# Patient Record
Sex: MALE | Race: WHITE | NOT HISPANIC OR LATINO | Employment: FULL TIME | URBAN - METROPOLITAN AREA
[De-identification: names, ages, dates, MRNs, and addresses within clinical notes are randomized per-mention and may not be internally consistent; named-entity substitution may affect disease eponyms.]

---

## 2017-08-18 ENCOUNTER — OFFICE VISIT (OUTPATIENT)
Dept: URGENT CARE | Facility: PHYSICIAN GROUP | Age: 67
End: 2017-08-18
Payer: COMMERCIAL

## 2017-08-18 ENCOUNTER — HOSPITAL ENCOUNTER (OUTPATIENT)
Dept: RADIOLOGY | Facility: MEDICAL CENTER | Age: 67
End: 2017-08-18
Attending: PHYSICIAN ASSISTANT
Payer: COMMERCIAL

## 2017-08-18 VITALS
WEIGHT: 179 LBS | SYSTOLIC BLOOD PRESSURE: 112 MMHG | HEIGHT: 75 IN | BODY MASS INDEX: 22.26 KG/M2 | DIASTOLIC BLOOD PRESSURE: 62 MMHG | RESPIRATION RATE: 16 BRPM | TEMPERATURE: 97.5 F | OXYGEN SATURATION: 94 % | HEART RATE: 80 BPM

## 2017-08-18 DIAGNOSIS — J18.9 PNEUMONIA OF LEFT LOWER LOBE DUE TO INFECTIOUS ORGANISM: ICD-10-CM

## 2017-08-18 DIAGNOSIS — R05.9 COUGH: ICD-10-CM

## 2017-08-18 PROCEDURE — 99203 OFFICE O/P NEW LOW 30 MIN: CPT | Performed by: PHYSICIAN ASSISTANT

## 2017-08-18 PROCEDURE — 71020 DX-CHEST-2 VIEWS: CPT

## 2017-08-18 RX ORDER — LEVOFLOXACIN 500 MG/1
500 TABLET, FILM COATED ORAL DAILY
Qty: 10 TAB | Refills: 0 | Status: SHIPPED | OUTPATIENT
Start: 2017-08-18 | End: 2017-08-28

## 2017-08-18 RX ORDER — CODEINE PHOSPHATE AND GUAIFENESIN 10; 100 MG/5ML; MG/5ML
SOLUTION ORAL
Qty: 100 ML | Refills: 0 | Status: SHIPPED | OUTPATIENT
Start: 2017-08-18 | End: 2017-11-13 | Stop reason: SDUPTHER

## 2017-08-18 NOTE — PROGRESS NOTES
"Chief Complaint   Patient presents with   • Cough     x 4days,fever       HISTORY OF PRESENT ILLNESS: Patient is a 66 y.o. male who presents today for 5 days of worsening cough.  Patient states it started straight into coughing, chest congestion.  Patient states he has been medicating at home with homeopathic remedies but not helping.  He has been having sweats and chills and perceived fevers.   He is feeling intermittently SOB.  No chest pain, no painful breathing.  He feels rattling in his chest.  No nausea no vomiting.     Patient Active Problem List    Diagnosis Date Noted   • Eczema 2015   • Heart murmur 2015   • Tobacco abuse 2015       Allergies:Eggs    Current Outpatient Prescriptions Ordered in [facility]   Medication Sig Dispense Refill   • triamcinolone acetonide (KENALOG) 0.1 % OINT Apply 1 Application to affected area(s) 2 times a day. 1 Tube 3     No current Epic-ordered facility-administered medications on file.       History reviewed. No pertinent past medical history.    Social History   Substance Use Topics   • Smoking status: Current Every Day Smoker -- 1.00 packs/day     Types: Cigarettes   • Smokeless tobacco: None   • Alcohol Use: No       Family Status   Relation Status Death Age   • Mother     • Father       Family History   Problem Relation Age of Onset   • Heart Disease Mother 86   • Cancer Father      lung       ROS:  Review of Systems   Constitutional: SEE HPI  HENT: SEE HPI  Eyes: Negative for blurred vision.   Respiratory: SEE HPI  Cardiovascular: Negative for chest pain, palpitations, orthopnea and leg swelling.   Gastrointestinal: Negative for heartburn, nausea, vomiting and abdominal pain.   All other systems reviewed and are negative.       Exam:  Blood pressure 112/62, pulse 80, temperature 36.4 °C (97.5 °F), resp. rate 16, height 1.905 m (6' 3\"), weight 81.194 kg (179 lb), SpO2 94 %.  General:  Well nourished, well developed male in NAD  Eyes: CURLY, " EOM within normal limits, no conjunctival injection, no scleral icterus, visual fields and acuity grossly intact.  Ears: Normal shape and symmetry, no tenderness, no discharge. External canals are without any significant edema or erythema. Tympanic membranes are without any inflammation, no effusion. Gross auditory acuity is intact  Nose: Symmetrical, sinuses without tenderness, scant clear rhinorrhea.   Mouth: reasonable hygiene, no erythema exudates or tonsillar enlargement.  Neck: no masses, range of motion within normal limits, no tracheal deviation. No lymphadenopathy  Pulmonary: Normal respiratory effort, wheezes/crackles of the left lung, right CTA.   Cardiovascular: regular rate and rhythm without murmurs, rubs, or gallops.  Abdomen: Soft, nontender, nondistended. Normal bowel sounds. No hepatosplenomegaly or masses, or hernias. No rebound or guarding.  Skin: No visible rashes or lesion. Warm, pink, dry.   Extremities: no clubbing, cyanosis, or edema.  Neuro: A&O x 3. Speech normal/clear.  Normal gait.     RAD    Impression        1.  LEFT lower lobe pneumonia.  2.  Follow-up recommended to resolution, to exclude underlying process.  3.  No pleural fluid or pneumothorax.         Reading Provider Reading Date     Vikas Zepeda M.D. Aug 18, 2017       Assessment/Plan:  1. Pneumonia of left lower lobe due to infectious organism  DX-CHEST-2 VIEWS    levofloxacin (LEVAQUIN) 500 MG tablet    guaifenesin-codeine (CHERATUSSIN AC) Solution oral solution       -RAD as above.  Patient is smoker, >65.    Levaquin written as above.   -codeine cough syrup as above.  Emphasized drowsy and no driving on this medicine.  Patient expressed understanding of this.   -humidifier/steamy showers recommended for lung soothing.  Rest and fluids emphasized.   -strict ER precautions  -emphasized importance of follow up regarding this, repeat RAD in 3-4 weeks.     -counseled smoking cessation.     Ready to quit: No  Counseling given:  Yes      Supportive care, differential diagnoses, and indications for immediate follow-up discussed with patient.   Pathogenesis of diagnosis discussed including typical length and natural progression.   Instructed to return to clinic or nearest emergency department for any change in condition, further concerns, or worsening of symptoms.  Patient states understanding of the plan of care and discharge instructions.  Instructed to make an appointment, for follow up, with their primary care provider.      Diya Gorman PA-C

## 2017-08-18 NOTE — MR AVS SNAPSHOT
"Bharat Singh   2017 9:00 AM   Office Visit   MRN: [id number]    Department:  [facility]   Dept Phone:  [phone]    Description:  Male : 1950   Provider:  Diya Gorman PA-C           Reason for Visit     Cough x 4days,fever      Allergies as of 2017     Allergen Noted Reactions    Eggs 2015         You were diagnosed with     Pneumonia of left lower lobe due to infectious organism   [[id number]]         Vital Signs     Blood Pressure Pulse Temperature Respirations Height Weight    112/62 mmHg 80 36.4 °C (97.5 °F) 16 1.905 m (6' 3\") 81.194 kg (179 lb)    Body Mass Index Oxygen Saturation Smoking Status             22.37 kg/m2 94% Current Every Day Smoker         Basic Information     Date Of Birth Sex Race Ethnicity Preferred Language    1950 Male White Non- English      Problem List              ICD-10-CM Priority Class Noted - Resolved    Eczema L30.9   2015 - Present    Heart murmur R01.1   2015 - Present    Tobacco abuse Z72.0   2015 - Present      Health Maintenance        Date Due Completion Dates    IMM DTaP/Tdap/Td Vaccine (1 - Tdap) 1968 ---    IMM ZOSTER VACCINE 2009 ---    IMM PNEUMOCOCCAL 65+ (ADULT) LOW/MEDIUM RISK SERIES (1 of 2 - PCV13) 2014 ---    IMM INFLUENZA (1) 2017 ---    COLONOSCOPY 2025 (Declined)    Override on 2015: Patient Declined            Current Immunizations     No immunizations on file.      Below and/or attached are the medications your provider expects you to take. Review all of your home medications and newly ordered medications with your provider and/or pharmacist. Follow medication instructions as directed by your provider and/or pharmacist. Please keep your medication list with you and share with your provider. Update the information when medications are discontinued, doses are changed, or new medications (including over-the-counter products) are added; and carry " medication information at all times in the event of emergency situations     Allergies:  EGGS - (reactions not documented)               Medications  Valid as of: August 18, 2017 - 10:35 AM    Generic Name Brand Name Tablet Size Instructions for use    Guaifenesin-Codeine (Solution) ROBITUSSIN -10 mg/5mL Take 1-2 teaspoons at bedtime prn cough. No driving        LevoFLOXacin (Tab) LEVAQUIN 500 MG Take 1 Tab by mouth every day for 10 days.        Triamcinolone Acetonide (Ointment) KENALOG 0.1 % Apply 1 Application to affected area(s) 2 times a day.        .                 Medicines prescribed today were sent to:     [facility] [address] [place] [address] [place], [place] - [address] AT [facility] OF [facility]. & [address] [place]    [address] [facility] [address]    Phone: [phone] Fax: [phone]    Open 24 Hours?: No      Medication refill instructions:       If your prescription bottle indicates you have medication refills left, it is not necessary to call your provider’s office. Please contact your pharmacy and they will refill your medication.    If your prescription bottle indicates you do not have any refills left, you may request refills at any time through one of the following ways: The online Med ePad system (except Urgent Care), by calling your provider’s office, or by asking your pharmacy to contact your provider’s office with a refill request. Medication refills are processed only during regular business hours and may not be available until the next business day. Your provider may request additional information or to have a follow-up visit with you prior to refilling your medication.   *Please Note: Medication refills are assigned a new Rx number when refilled electronically. Your pharmacy may indicate that no refills were authorized even though a new prescription for the same medication is available at the pharmacy. Please request the medicine by name with the pharmacy before contacting your provider  for a refill.        Your To Do List     Future Labs/Procedures Complete By Expires    DX-CHEST-2 VIEWS  As directed 8/18/2018      Instructions    Pneumonia, Adult  Pneumonia is an infection of the lungs.   CAUSES  Pneumonia may be caused by bacteria or a virus. Usually, the infection is caused by breathing in droplets from an infected person's cough or sneeze.   SYMPTOMS   Symptoms of pneumonia include:  · Cough.  · Fever.  · Chest pain.  · Rapid breathing.  · Shortness of breath.  · Shaking chills.  · Mucus production.  DIAGNOSIS   If you have the common symptoms of pneumonia, often your health care provider will confirm the diagnosis with a chest X-ray. The X-ray will show an abnormality in the lung if you have pneumonia. Other tests may be done on your blood, urine, or mucus (sputum) to find the specific cause of your pneumonia. A blood gas test or pulse oximetry test may be needed to check how well your lungs are working.  TREATMENT   Your treatment will depend on whether your pneumonia is caused by bacteria or a virus.   · Bacterial pneumonia is treated with antibiotic medicine.  · Pneumonia that is caused by the influenza virus may be treated with an antiviral medicine.  · Pneumonia that is caused by a virus other than influenza will not respond to antibiotic medicine. This type of pneumonia will have to run its course.   HOME CARE INSTRUCTIONS   · Cough suppressants may be used if you are losing too much rest from coughing at night. However, you should try to avoid taking cough suppresants. This is because coughing helps to remove mucus from your lungs.  · Sleep in a semi-upright position at night. Try sleeping in a reclining chair, or place a few pillows under your head.  · Try using a cold steam vaporizer or humidifier in your home or bedroom. This may help loosen your mucus.  · If you were prescribed an antibiotic medicine, finish all of it even if you start to feel better.  · If you were prescribed an  expectorant, take it as directed by your health care provider. This medicine loosens the mucus so you can cough it up.  · Take medicines only as directed by your health care provider.  · Do not smoke. If you are a smoker and continue to smoke, your cough may last several weeks after your pneumonia has cleared.  · Get rest when you feel tired, or as needed.  PREVENTION  A pneumococcal shot (vaccine) is available to prevent a common bacterial cause of pneumonia. This is usually suggested for:  · People over 65 years old.  · People on chemotherapy.  · People with chronic lung problems, such as bronchitis or emphysema.  · People with immune system problems.  If you are over 65 years old or have a high risk condition, you may receive the pneumococcal vaccine if you have not received it before. In some countries, a routine influenza vaccine is also recommended. This vaccine can help prevent some cases of pneumonia. You may be offered the influenza vaccine as part of your care.  If you are a smoker, it is time to quit in order to prevent pneumonia in the future. You may receive instructions on how to stop smoking. Your health care provider can provide medicines and counseling to help you quit.  SEEK MEDICAL CARE IF:  · You have a fever.  · You cannot control your cough with suppressants at night, and you keep losing sleep.  SEEK IMMEDIATE MEDICAL CARE IF:   · You have worsening shortness of breath.  · You have increased chest pain.  · Your sickness becomes worse, especially if you are an older adult or have a weakened immune system.  · You cough up blood.  · You have pain that is getting worse or is not controlled with medicines.  · Your symptoms are getting worse rather than better.     This information is not intended to replace advice given to you by your health care provider. Make sure you discuss any questions you have with your health care provider.     Document Released: 12/18/2006 Document Revised: 01/08/2016  Document Reviewed: 04/13/2016  Flowgram Interactive Patient Education ©2016 [employer]vier Inc.            Hulafrog Access Code: [email or web address]  Expires: 9/17/2017  8:55 AM    Hulafrog  A secure, online tool to manage your health information     [employer] Hulafrog® is a secure, online tool that connects you to your personalized health information from the privacy of your home -- day or night - making it very easy for you to manage your healthcare. Once the activation process is completed, you can even access your medical information using the Hulafrog chiquita, which is available for free in the Apple Chiquita store or Google Play store.     Hulafrog provides the following levels of access (as shown below):   My Chart Features   [facility] Doctor [facility]  Urgent  Care Non-[facility]  Primary Care  Doctor   Email your healthcare team securely and privately 24/7 X X X    Manage appointments: schedule your next appointment; view details of past/upcoming appointments X      Request prescription refills. X      View recent personal medical records, including lab and immunizations X X X X   View health record, including health history, allergies, medications X X X X   Read reports about your outpatient visits, procedures, consult and ER notes X X X X   See your discharge summary, which is a recap of your hospital and/or ER visit that includes your diagnosis, lab results, and care plan. X X       How to register for Hulafrog:  1. Go to  [email or web address].  2. Click on the Sign Up Now box, which takes you to the New Member Sign Up page. You will need to provide the following information:  a. Enter your Hulafrog Access Code exactly as it appears at the top of this page. (You will not need to use this code after you’ve completed the sign-up process. If you do not sign up before the expiration date, you must request a new code.)   b. Enter your date of birth.   c. Enter your home email address.   d. Click  Submit, and follow the next screen’s instructions.  3. Create a Anaphoret ID. This will be your Arbovax login ID and cannot be changed, so think of one that is secure and easy to remember.  4. Create a Anaphoret password. You can change your password at any time.  5. Enter your Password Reset Question and Answer. This can be used at a later time if you forget your password.   6. Enter your e-mail address. This allows you to receive e-mail notifications when new information is available in Arbovax.  7. Click Sign Up. You can now view your health information.    For assistance activating your Arbovax account, call [phone]        Quit Tobacco Information     Do you want to quit using tobacco?    Quitting tobacco decreases risks of cancer, heart and lung disease, increases life expectancy, improves sense of taste and smell, and increases spending money, among other benefits.    If you are thinking about quitting, we can help.  • [facility] Quit Tobacco Program: 684.959.7003  o Program occurs weekly for four weeks and includes pharmacist consultation on products to support quitting smoking or chewing tobacco. A provider referral is needed for pharmacist consultation.  • Tobacco Users Help Hotline: 2-284-QUIT-NOW (568-8270) or https://[place].quitlogix.org/  o Free, confidential telephone and online coaching for [place] residents. Sessions are designed on a schedule that is convenient for you. Eligible clients receive free nicotine replacement therapy.  • Nationally: www.smokefree.gov  o Information and professional assistance to support both immediate and long-term needs as you become, and remain, a non-smoker. Smokefree.gov allows you to choose the help that best fits your needs.

## 2017-08-18 NOTE — PATIENT INSTRUCTIONS
Pneumonia, Adult  Pneumonia is an infection of the lungs.   CAUSES  Pneumonia may be caused by bacteria or a virus. Usually, the infection is caused by breathing in droplets from an infected person's cough or sneeze.   SYMPTOMS   Symptoms of pneumonia include:  · Cough.  · Fever.  · Chest pain.  · Rapid breathing.  · Shortness of breath.  · Shaking chills.  · Mucus production.  DIAGNOSIS   If you have the common symptoms of pneumonia, often your health care provider will confirm the diagnosis with a chest X-ray. The X-ray will show an abnormality in the lung if you have pneumonia. Other tests may be done on your blood, urine, or mucus (sputum) to find the specific cause of your pneumonia. A blood gas test or pulse oximetry test may be needed to check how well your lungs are working.  TREATMENT   Your treatment will depend on whether your pneumonia is caused by bacteria or a virus.   · Bacterial pneumonia is treated with antibiotic medicine.  · Pneumonia that is caused by the influenza virus may be treated with an antiviral medicine.  · Pneumonia that is caused by a virus other than influenza will not respond to antibiotic medicine. This type of pneumonia will have to run its course.   HOME CARE INSTRUCTIONS   · Cough suppressants may be used if you are losing too much rest from coughing at night. However, you should try to avoid taking cough suppresants. This is because coughing helps to remove mucus from your lungs.  · Sleep in a semi-upright position at night. Try sleeping in a reclining chair, or place a few pillows under your head.  · Try using a cold steam vaporizer or humidifier in your home or bedroom. This may help loosen your mucus.  · If you were prescribed an antibiotic medicine, finish all of it even if you start to feel better.  · If you were prescribed an expectorant, take it as directed by your health care provider. This medicine loosens the mucus so you can cough it up.  · Take medicines only as  directed by your health care provider.  · Do not smoke. If you are a smoker and continue to smoke, your cough may last several weeks after your pneumonia has cleared.  · Get rest when you feel tired, or as needed.  PREVENTION  A pneumococcal shot (vaccine) is available to prevent a common bacterial cause of pneumonia. This is usually suggested for:  · People over 65 years old.  · People on chemotherapy.  · People with chronic lung problems, such as bronchitis or emphysema.  · People with immune system problems.  If you are over 65 years old or have a high risk condition, you may receive the pneumococcal vaccine if you have not received it before. In some countries, a routine influenza vaccine is also recommended. This vaccine can help prevent some cases of pneumonia. You may be offered the influenza vaccine as part of your care.  If you are a smoker, it is time to quit in order to prevent pneumonia in the future. You may receive instructions on how to stop smoking. Your health care provider can provide medicines and counseling to help you quit.  SEEK MEDICAL CARE IF:  · You have a fever.  · You cannot control your cough with suppressants at night, and you keep losing sleep.  SEEK IMMEDIATE MEDICAL CARE IF:   · You have worsening shortness of breath.  · You have increased chest pain.  · Your sickness becomes worse, especially if you are an older adult or have a weakened immune system.  · You cough up blood.  · You have pain that is getting worse or is not controlled with medicines.  · Your symptoms are getting worse rather than better.     This information is not intended to replace advice given to you by your health care provider. Make sure you discuss any questions you have with your health care provider.     Document Released: 12/18/2006 Document Revised: 01/08/2016 Document Reviewed: 04/13/2016  Cortera Interactive Patient Education ©2016 [employer].

## 2017-11-13 ENCOUNTER — OFFICE VISIT (OUTPATIENT)
Dept: URGENT CARE | Facility: PHYSICIAN GROUP | Age: 67
End: 2017-11-13
Payer: COMMERCIAL

## 2017-11-13 ENCOUNTER — HOSPITAL ENCOUNTER (OUTPATIENT)
Dept: RADIOLOGY | Facility: MEDICAL CENTER | Age: 67
End: 2017-11-13
Attending: PHYSICIAN ASSISTANT
Payer: COMMERCIAL

## 2017-11-13 ENCOUNTER — TELEPHONE (OUTPATIENT)
Dept: URGENT CARE | Facility: PHYSICIAN GROUP | Age: 67
End: 2017-11-13

## 2017-11-13 VITALS
DIASTOLIC BLOOD PRESSURE: 68 MMHG | WEIGHT: 176 LBS | SYSTOLIC BLOOD PRESSURE: 122 MMHG | RESPIRATION RATE: 36 BRPM | HEART RATE: 110 BPM | HEIGHT: 75 IN | BODY MASS INDEX: 21.88 KG/M2 | OXYGEN SATURATION: 91 % | TEMPERATURE: 98.6 F

## 2017-11-13 DIAGNOSIS — J18.9 PNEUMONIA OF LEFT LOWER LOBE DUE TO INFECTIOUS ORGANISM: Primary | ICD-10-CM

## 2017-11-13 DIAGNOSIS — R09.02 HYPOXIA: ICD-10-CM

## 2017-11-13 DIAGNOSIS — R05.9 COUGH: ICD-10-CM

## 2017-11-13 DIAGNOSIS — R68.89 FLU-LIKE SYMPTOMS: ICD-10-CM

## 2017-11-13 DIAGNOSIS — R07.1 CHEST PAIN ON BREATHING: ICD-10-CM

## 2017-11-13 LAB
FLUAV+FLUBV AG SPEC QL IA: NEGATIVE
INT CON NEG: NORMAL
INT CON POS: NORMAL

## 2017-11-13 PROCEDURE — 99214 OFFICE O/P EST MOD 30 MIN: CPT | Mod: 25 | Performed by: PHYSICIAN ASSISTANT

## 2017-11-13 PROCEDURE — 87804 INFLUENZA ASSAY W/OPTIC: CPT | Performed by: PHYSICIAN ASSISTANT

## 2017-11-13 PROCEDURE — 71020 DX-CHEST-2 VIEWS: CPT

## 2017-11-13 PROCEDURE — 94640 AIRWAY INHALATION TREATMENT: CPT | Performed by: PHYSICIAN ASSISTANT

## 2017-11-13 RX ORDER — IBUPROFEN 200 MG
200 TABLET ORAL EVERY 6 HOURS PRN
COMMUNITY
End: 2018-10-31

## 2017-11-13 RX ORDER — ACETAMINOPHEN 500 MG
500-1000 TABLET ORAL EVERY 6 HOURS PRN
COMMUNITY
End: 2018-10-31

## 2017-11-13 RX ORDER — IPRATROPIUM BROMIDE AND ALBUTEROL SULFATE 2.5; .5 MG/3ML; MG/3ML
3 SOLUTION RESPIRATORY (INHALATION) ONCE
Status: COMPLETED | OUTPATIENT
Start: 2017-11-13 | End: 2017-11-13

## 2017-11-13 RX ORDER — CODEINE PHOSPHATE AND GUAIFENESIN 10; 100 MG/5ML; MG/5ML
SOLUTION ORAL
Qty: 100 ML | Refills: 0 | Status: SHIPPED | OUTPATIENT
Start: 2017-11-13 | End: 2018-10-31

## 2017-11-13 RX ORDER — CEFTRIAXONE 1 G/1
1 INJECTION, POWDER, FOR SOLUTION INTRAMUSCULAR; INTRAVENOUS ONCE
Status: COMPLETED | OUTPATIENT
Start: 2017-11-13 | End: 2017-11-13

## 2017-11-13 RX ORDER — LEVOFLOXACIN 500 MG/1
500 TABLET, FILM COATED ORAL DAILY
Qty: 7 TAB | Refills: 0 | Status: SHIPPED | OUTPATIENT
Start: 2017-11-13 | End: 2017-11-20

## 2017-11-13 RX ADMIN — IPRATROPIUM BROMIDE AND ALBUTEROL SULFATE 3 ML: 2.5; .5 SOLUTION RESPIRATORY (INHALATION) at 11:10

## 2017-11-13 RX ADMIN — CEFTRIAXONE 1 G: 1 INJECTION, POWDER, FOR SOLUTION INTRAMUSCULAR; INTRAVENOUS at 12:23

## 2017-11-13 ASSESSMENT — ENCOUNTER SYMPTOMS
CARDIOVASCULAR NEGATIVE: 1
BACK PAIN: 1
WHEEZING: 1
SORE THROAT: 1
HEADACHES: 1
FEVER: 1
COUGH: 1
DIZZINESS: 0
MYALGIAS: 1
PSYCHIATRIC NEGATIVE: 1
SHORTNESS OF BREATH: 1
GASTROINTESTINAL NEGATIVE: 1
EYES NEGATIVE: 1
CHILLS: 1

## 2017-11-13 NOTE — PROGRESS NOTES
Subjective:      Bharat Singh is a 67 y.o. male who presents with Cough (bodyaches x 5 days)            HPI  Chief Complaint   Patient presents with   • Cough     bodyaches x 5 days       HPI:  Bharat Singh is a 67 y.o.Male who presents with cough and body aches ×5 days. He is every day smoker not interested in quitting at this time. Has been having productive cough with yellow mucus. Fever of up to 102 at home. Has been taking ibuprofen. Did not get flu shot this year due to allergy to eggs. Worsening headache and shortness of breath Patient denies HA,  palpitations, or n/v/d.  Also had vomiting over the past several days and worsening body aches since Saturday.    Worsening productive cough, patient states he feels way worse that his last episode of left lower lobe pneumonia in August.    sNo past medical history on file.    No past surgical history on file.    Family History   Problem Relation Age of Onset   • Heart Disease Mother 86   • Cancer Father      lung     No pertinent family history.    Social History     Social History   • Marital status:      Spouse name: N/A   • Number of children: N/A   • Years of education: N/A     Occupational History   • Not on file.     Social History Main Topics   • Smoking status: Current Every Day Smoker     Packs/day: 1.00     Types: Cigarettes   • Smokeless tobacco: Former User   • Alcohol use No   • Drug use: No   • Sexual activity: Yes     Partners: Female     Other Topics Concern   • Not on file     Social History Narrative   • No narrative on file         Current Outpatient Prescriptions:   •  acetaminophen, 500-1,000 mg, Oral, Q6HRS PRN, 11/13/2017  •  ibuprofen, 200 mg, Oral, Q6HRS PRN  •  guaifenesin-codeine, Take 1-2 teaspoons at bedtime prn cough. No driving  •  triamcinolone acetonide, 1 Application, Topical, BID, not taking    Allergies   Allergen Reactions   • Eggs         Review of Systems   Constitutional: Positive for chills, fever and  "malaise/fatigue.   HENT: Positive for congestion and sore throat.    Eyes: Negative.    Respiratory: Positive for cough, shortness of breath and wheezing.    Cardiovascular: Negative.    Gastrointestinal: Negative.    Genitourinary: Negative.    Musculoskeletal: Positive for back pain and myalgias.   Neurological: Positive for headaches. Negative for dizziness.   Endo/Heme/Allergies: Negative.    Psychiatric/Behavioral: Negative.           Objective:     /68   Pulse (!) 110   Temp 37 °C (98.6 °F)   Resp (!) 36   Ht 1.905 m (6' 3\")   Wt 79.8 kg (176 lb)   SpO2 91%   BMI 22.00 kg/m²      Physical Exam       Nursing note and vitals reviewed.    Constitutional:   Appropriately groomed, pleasant affect, well nourished, in NAD.    Head:   Normocephalic, atraumatic.    Eyes:   PERRLA, EOM's full, sclera white, conjunctiva not erythematous, and medial canthus without exudate bilaterally.    Ears:  Auricle and tragus non-tender to manipulation.  No pre-auricular lymphadenopathy or mastoid ttp.  EACs with mild cerumen bilaterally, not erythematous.  TM’s pearly gray with cone of light present and umbo and malleolus visible bilaterally.  No bulging or fluid bubbles present in middle ear.  Hearing grossly intact to voice.    Nose:  Nares not patent bilaterally.  Nasal mucosa edematous with white rhinorrhea bilaterally.  Mild sinus tenderness to percussion.    Throat:  Dentition wnl, mucosa moist without lesions.  Oropharynx mildly erythematous, with no enlargement of the palatine tonsils bilaterally with no exudates.    Post nasal drainage  present.  Soft palate rises symmetrically bilaterally and uvula midline.      Neck: Neck supple, with mild anterior lymphadenopathy that is soft and mobile to palpation. Thyroid non-palpable without tenderness or nodules. No supraclavicular lymphadenopathy.    Lungs:  Respiratory effort not labored without accessory muscle use.  Lungs with inspiratoryAnd expiratory wheezes to " auscultation throughout. Left lower lung rales. Rhonchi cleared with cough.    Heart:  RRR, without murmurs rubs or gallops.  Radial and dorsalis pedis pulse 2+ bilaterally.  No LE edema.    Musculoskeletal:  Gait non-antalgic with a narrow base.    Derm:  Skin without rashes or lesions with good turgor pressure.      Psychiatric:  Mood, affect, and judgement appropriate.       11/13/2017 11:30 AM    HISTORY/REASON FOR EXAM:  Cough, fever and chills for one week      TECHNIQUE/EXAM DESCRIPTION AND NUMBER OF VIEWS:  Two views of the chest.    COMPARISON:  8/18/2017    FINDINGS:      The mediastinal and cardiac silhouette is unremarkable.    The pulmonary vascularity is within normal limits.    There is ill-defined parenchymal process in the region of the lingula and left lower lobe. This was also seen on the prior study.    There is no significant pleural effusion.    There is no visible pneumothorax.    There are no acute bony abnormalities.   Impression       1.  There is continued lingular and left lower lobe parenchymal process suspicious for pneumonitis. A proximal obstructing process cannot be excluded. A CT of the chest with contrast is recommended.   Reading Provider Reading Date   Fidelia Godinez M.D. Nov 13, 2017   Signing Provider Signing Date Signing Time   Fidelia Godinez M.D. Nov 13, 2017 11:45 AM       Assessment/Plan:     1. Pneumonia of left lower lobe due to infectious organism (CMS-[facility])  guaifenesin-codeine (CHERATUSSIN AC) Solution oral solution    cefTRIAXone (ROCEPHIN) injection 1 g    levofloxacin (LEVAQUIN) 500 MG tablet    UC AMA/Refusal of Treatment    CANCELED: CT-CTA CHEST PULMONARY ARTERY W/ RECONS   2. Cough  DX-CHEST-2 VIEWS    POCT Influenza A/B    ipratropium-albuterol (DUONEB) nebulizer solution 3 mL    UC AMA/Refusal of Treatment    CANCELED: CT-CTA CHEST PULMONARY ARTERY W/ RECONS   3. Flu-like symptoms  DX-CHEST-2 VIEWS    POCT Influenza A/B    ipratropium-albuterol (DUONEB)  nebulizer solution 3 mL    UC AMA/Refusal of Treatment    CANCELED: CT-CTA CHEST PULMONARY ARTERY W/ RECONS   4. Chest pain on breathing  UC AMA/Refusal of Treatment    CANCELED: CT-CTA CHEST PULMONARY ARTERY W/ RECONS   5. Hypoxia       Patient presents with cough for the past week which did improve slightly Thursday/Friday and then worsened again. Patient now with significant muscle/bodyaches and started on Saturday with headache and shortness of breath. On exam patient is afebrile but tachycardic with an elevated respiratory rate and borderline hypoxia. Rapid flu negative. Post DuoNeb patient's oxygen saturation did Not improve. Still at 91% room air. Place patient on 2 L nasal cannula with only mild improvement to 93%. Lungs with slight improvement to auscultation. X-ray reviewed by me and was patient shows continuation of consolidation in the left lower lobe with concern for proximal obstructing object. Did discuss with patient at length ER transfer due to concern for hypoxia, and pulmonary embolism patient deferred. Did discuss CTA imaging which patient also deferred. Did discuss lab tests which patient deferred.    Discussed patient's case with Dr. Alcantar who agreed with ER transfer.    Tobacco cessation:  Ready to quit: Yes  Counseling given: Yes     Please note that this dictation was created using voice recognition software.  I have made every reasonable attempt to correct obvious errors, but I expect there are errors of dayna and possibly content that I did not discover before finalizing the note.

## 2017-11-14 NOTE — TELEPHONE ENCOUNTER
Called patient and discussed with him again the importance of going to the ER. He did not sound well on the phone out of breath. Patient states he has not worsens still has chest pain. Still has productive cough. Plans to go to the emergency room in the morning. Advised him to call 911 at any time over the night if worsening. Also offered: [facility] pickup now at this time and patient deferred.

## 2017-11-15 ENCOUNTER — TELEPHONE (OUTPATIENT)
Dept: URGENT CARE | Facility: CLINIC | Age: 67
End: 2017-11-15

## 2018-10-15 ENCOUNTER — OFFICE VISIT (OUTPATIENT)
Dept: MEDICAL GROUP | Facility: MEDICAL CENTER | Age: 68
End: 2018-10-15
Payer: COMMERCIAL

## 2018-10-15 VITALS
SYSTOLIC BLOOD PRESSURE: 124 MMHG | WEIGHT: 188 LBS | OXYGEN SATURATION: 98 % | HEIGHT: 75 IN | BODY MASS INDEX: 23.38 KG/M2 | HEART RATE: 84 BPM | RESPIRATION RATE: 16 BRPM | DIASTOLIC BLOOD PRESSURE: 72 MMHG

## 2018-10-15 DIAGNOSIS — Z23 NEED FOR PNEUMOCOCCAL VACCINATION: ICD-10-CM

## 2018-10-15 DIAGNOSIS — Z00.00 ROUTINE GENERAL MEDICAL EXAMINATION AT A HEALTH CARE FACILITY: ICD-10-CM

## 2018-10-15 DIAGNOSIS — M25.552 PAIN OF LEFT HIP JOINT: ICD-10-CM

## 2018-10-15 DIAGNOSIS — Z72.0 TOBACCO ABUSE: ICD-10-CM

## 2018-10-15 DIAGNOSIS — R01.1 HEART MURMUR: ICD-10-CM

## 2018-10-15 DIAGNOSIS — R93.89 ABNORMAL CHEST X-RAY: ICD-10-CM

## 2018-10-15 PROCEDURE — 90471 IMMUNIZATION ADMIN: CPT | Performed by: NURSE PRACTITIONER

## 2018-10-15 PROCEDURE — 99214 OFFICE O/P EST MOD 30 MIN: CPT | Mod: 25 | Performed by: NURSE PRACTITIONER

## 2018-10-15 PROCEDURE — 90670 PCV13 VACCINE IM: CPT | Performed by: NURSE PRACTITIONER

## 2018-10-15 ASSESSMENT — PATIENT HEALTH QUESTIONNAIRE - PHQ9: CLINICAL INTERPRETATION OF PHQ2 SCORE: 0

## 2018-10-15 NOTE — PROGRESS NOTES
Subjective:      Bharat Singh is a 68 y.o. male who presents with [facility]        CC: Patient is here today to reestablish care as well as for need of lab work and imaging as well as left hip pain.    HPI Bharat Singh      1. Abnormal chest x-ray  Patient was seen at urgent care about a year ago and at that time he was treated for a pneumonia.  His follow-up chest x-ray 2 months later showed continued lingular and left lower lobe parenchymal process.  A proximal obstructing process could not be excluded and a CT was recommended but not done.  He states he is over his pneumonia but does continue to smoke at least a pack of cigarettes a day.    2. Tobacco abuse  Patient continues to smoke 1 pack of cigarettes a day and is not ready to quit.    3. Routine general medical examination at a health care facility  Patient was seen in 2015 he declined all lab work and health maintenance stating he only wanted to come in when he was ill.    4. Heart murmur  Patient was noted to have heart murmur in 2015 which he states was first noted many years ago when he was in the .  He declined echocardiogram then and now.  He states he feels good and is able to walk at least a mile a day without any problems such as chest pain or shortness of breath.    5. Pain of left hip joint  Patient reports he does get some pain in his left hip mainly because he works as a  which requires much walking.  He states he has not falling and he usually takes Tylenol as needed for pain.  It does not radiate down his leg.      6. Need for pneumococcal vaccination  Patient declines flu vaccine but states he would like pneumonia vaccine.  Social History   Substance Use Topics   • Smoking status: Current Every Day Smoker     Packs/day: 1.00     Types: Cigarettes   • Smokeless tobacco: Former User   • Alcohol use No     Current Outpatient Prescriptions   Medication Sig Dispense Refill   • acetaminophen (TYLENOL) 500 MG Tab  "Take 500-1,000 mg by mouth every 6 hours as needed.     • ibuprofen (MOTRIN) 200 MG Tab Take 200 mg by mouth every 6 hours as needed.     • guaifenesin-codeine (CHERATUSSIN AC) Solution oral solution Take 1-2 teaspoons at bedtime prn cough. No driving 100 mL 0   • triamcinolone acetonide (KENALOG) 0.1 % OINT Apply 1 Application to affected area(s) 2 times a day. 1 Tube 3     No current facility-administered medications for this visit.    History reviewed. No pertinent past medical history.   Family History   Problem Relation Age of Onset   • Heart Disease Mother 86   • Cancer Father         lung       Review of Systems   Musculoskeletal: Positive for joint pain.   All other systems reviewed and are negative.         Objective:     /72 (BP Location: Left arm, Patient Position: Sitting, BP Cuff Size: Adult)   Pulse 84   Resp 16   Ht 1.905 m (6' 3\")   Wt 85.3 kg (188 lb)   SpO2 98%   BMI 23.50 kg/m²      Physical Exam   Constitutional: He is oriented to person, place, and time. He appears well-developed and well-nourished. No distress.   HENT:   Head: Normocephalic and atraumatic.   Right Ear: External ear normal.   Left Ear: External ear normal.   Nose: Nose normal.   Mouth/Throat: Oropharynx is clear and moist.   Eyes: Conjunctivae are normal. Right eye exhibits no discharge. Left eye exhibits no discharge.   Neck: Normal range of motion. Neck supple. No tracheal deviation present. No thyromegaly present.   Cardiovascular: Normal rate and regular rhythm.    Murmur heard.  Pulmonary/Chest: Effort normal. No respiratory distress. He has decreased breath sounds. He has no wheezes. He has no rales.   Musculoskeletal:        Left hip: He exhibits decreased range of motion.   Lymphadenopathy:     He has no cervical adenopathy.   Neurological: He is alert and oriented to person, place, and time. Coordination normal.   Skin: Skin is warm and dry. No rash noted. He is not diaphoretic. No erythema.   Psychiatric: " He has a normal mood and affect. His behavior is normal. Judgment and thought content normal.   Nursing note and vitals reviewed.              Assessment/Plan:     1. Abnormal chest x-ray  I advised patient on a CT of the chest based on his previous x-ray report and long-term tobacco usage and it appears he is willing to go for this.  If it is abnormal he will be referred to pulmonology.  - CT-CHEST (THORAX) W/O; Future    2. Tobacco abuse  Patient is still unwilling to quit smoking.  - CT-CHEST (THORAX) W/O; Future    3. Routine general medical examination at a health care facility  Patient advised he should at least have yearly blood work and I will have him follow back to discuss results.  He declines most of his health maintenance except for pneumonia vaccine.   - COMP METABOLIC PANEL; Future  - LIPID PROFILE; Future  - CBC WITHOUT DIFFERENTIAL; Future    4. Heart murmur  As I told patient in 2015, I strongly advise an echocardiogram to assess his valvular function but he again declines.  He states he feels well and is able to ambulate without shortness of breath and therefore does not wish to have an echocardiogram done.     5. Pain of left hip joint  The patient was offered options including physical therapy and a referral to sports medicine orthopedics but he declined both.  He may take Tylenol for pain for now and try to do less excessive lifting and walking.  He probably should have x-rays in the near future.    6. Need for pneumococcal vaccination  I have placed the below orders and discussed them with an approved delegating provider. The MA is performing the below orders under the direction of Dr. Paige  I advised second pneumonia vaccine next year and he declined flu vaccine.  - Prevnar 13 PCV-13

## 2018-10-31 ENCOUNTER — OFFICE VISIT (OUTPATIENT)
Dept: MEDICAL GROUP | Facility: MEDICAL CENTER | Age: 68
End: 2018-10-31
Payer: COMMERCIAL

## 2018-10-31 VITALS
BODY MASS INDEX: 23.62 KG/M2 | WEIGHT: 190 LBS | OXYGEN SATURATION: 96 % | SYSTOLIC BLOOD PRESSURE: 118 MMHG | DIASTOLIC BLOOD PRESSURE: 72 MMHG | TEMPERATURE: 97.1 F | RESPIRATION RATE: 16 BRPM | HEIGHT: 75 IN | HEART RATE: 70 BPM

## 2018-10-31 DIAGNOSIS — R01.1 HEART MURMUR: ICD-10-CM

## 2018-10-31 DIAGNOSIS — R93.89 ABNORMAL CHEST X-RAY: ICD-10-CM

## 2018-10-31 DIAGNOSIS — N48.89 PENILE SWELLING: ICD-10-CM

## 2018-10-31 DIAGNOSIS — Z72.0 TOBACCO ABUSE: ICD-10-CM

## 2018-10-31 PROCEDURE — 99213 OFFICE O/P EST LOW 20 MIN: CPT | Performed by: NURSE PRACTITIONER

## 2018-10-31 RX ORDER — TRIAMCINOLONE ACETONIDE 0.25 MG/G
1 CREAM TOPICAL 2 TIMES DAILY
Qty: 1 TUBE | Refills: 0 | Status: SHIPPED | OUTPATIENT
Start: 2018-10-31

## 2018-10-31 NOTE — PROGRESS NOTES
Subjective:      Bharat Singh is a 68 y.o. male who presents with Other (personal, pt did not wated to talk about it)    CC: Patient here today for penile swelling concerns.      Bharat Singh        1. Penile swelling  Patient reports that for about 10 months he has noted that it is harder to retract his foreskin.  Sometimes he will get some tearing of the foreskin when he has intercourse or just trying to pull back his foreskin.  He states he has never been circumcised and has had no ulcers, penile discharge or dysuria.  Nothing seems to make it better and symptoms are worse with sex.    2. Tobacco abuse  Patient continues to smoke cigarettes at least 1 pack/day.    3. Heart murmur  Patient has history of heart murmur which he states he has had since he has been a child but has been asymptomatic so did not want to go further with imaging studies.  He states it was last imaged in 2015 when he was in the .    4. Abnormal chest x-ray  Patient had a chest x-ray in 2017 showing pneumonia.  3 months later he had a follow-up chest x-ray showing ill-defined parenchymal process in the region of the lingula and left lower lobe.  CT of the chest was recommended but unfortunately he has not scheduled for the testing as of yet because he is concerned about costs even though he does have insurance.  It was ordered without contrast to lessen the cost.  He has not yet done his lab work yet as well.  Social History   Substance Use Topics   • Smoking status: Current Every Day Smoker     Packs/day: 1.00     Types: Cigarettes   • Smokeless tobacco: Former User   • Alcohol use No     Current Outpatient Prescriptions   Medication Sig Dispense Refill   • triamcinolone acetonide (KENALOG) 0.025 % Cream Apply 1 Application to affected area(s) 2 times a day. 1 Tube 0   • triamcinolone acetonide (KENALOG) 0.1 % OINT Apply 1 Application to affected area(s) 2 times a day. 1 Tube 3     No current facility-administered medications  "for this visit.    History reviewed. No pertinent past medical history.   Family History   Problem Relation Age of Onset   • Heart Disease Mother 86   • Cancer Father         lung       HPI    Review of Systems   Skin: Positive for rash.   All other systems reviewed and are negative.         Objective:     /72 (BP Location: Right arm, Patient Position: Sitting, BP Cuff Size: Adult)   Pulse 70   Temp 36.2 °C (97.1 °F) (Temporal)   Resp 16   Ht 1.905 m (6' 3\")   Wt 86.2 kg (190 lb)   SpO2 96%   BMI 23.75 kg/m²      Physical Exam   Constitutional: He is oriented to person, place, and time. He appears well-developed and well-nourished. No distress.   HENT:   Head: Normocephalic and atraumatic.   Right Ear: External ear normal.   Left Ear: External ear normal.   Nose: Nose normal.   Mouth/Throat: Oropharynx is clear and moist.   Eyes: Conjunctivae are normal. Right eye exhibits no discharge. Left eye exhibits no discharge.   Neck: Normal range of motion. Neck supple. No tracheal deviation present. No thyromegaly present.   Cardiovascular: Normal rate and regular rhythm.    Murmur heard.  Pulmonary/Chest: Effort normal and breath sounds normal. No respiratory distress. He has no wheezes. He has no rales.   Genitourinary:   Genitourinary Comments: Patient is able to pull back his foreskin although he reports it is very tight and difficult to do so.  No obvious rash noted.   Lymphadenopathy:     He has no cervical adenopathy.   Neurological: He is alert and oriented to person, place, and time. Coordination normal.   Skin: Skin is warm and dry. No rash noted. He is not diaphoretic. No erythema.   Psychiatric: He has a normal mood and affect. His behavior is normal. Judgment and thought content normal.   Nursing note and vitals reviewed.              Assessment/Plan:     1. Penile swelling  This is an uncircumcised male who is complaining of difficulty retracting his foreskin for the past 10 months.  I will " therefore refer him to urology.  I told him he can try using some steroid cream around the area to see if this reduces inflammation but if it does not help after a week he should stop it.  I told him it should not be used for more than 2 weeks at a time.  He was advised to go to the emergency room if he finds before his urology appointment, he is unable to retract his foreskin.  - REFERRAL TO UROLOGY  - triamcinolone acetonide (KENALOG) 0.025 % Cream; Apply 1 Application to affected area(s) 2 times a day.  Dispense: 1 Tube; Refill: 0    2. Tobacco abuse  Patient again advised to quit smoking.    3. Heart murmur  Patient continues to have loud murmur and again refuses to go for echocardiogram because he states he feels well.    4. Abnormal chest x-ray  Patient reminded he was advised to go for CTA of the chest based on his previous chest x-ray from 2017 and recommendations from radiology.  I explained that with his smoking history he is high risk for lung cancer and therefore should have this evaluated.  He again states that he is not sure he wants to pay the co-pays.  I also reminded him he is overdue for his lab work.

## 2019-04-16 ENCOUNTER — TELEPHONE (OUTPATIENT)
Dept: MEDICAL GROUP | Facility: MEDICAL CENTER | Age: 69
End: 2019-04-16

## 2021-01-15 DIAGNOSIS — Z23 NEED FOR VACCINATION: ICD-10-CM

## 2023-05-22 NOTE — H&P
REFERRING PHYSICIAN: Sharad Martinez MD.     CONSULTING PHYSICIAN: Minerva Loving MD     CHIEF COMPLAINT: Shortness of Breath    HISTORY OF PRESENT ILLNESS: The patient is a 72 y.o. male with a past medical history of COPD, tobacco abuse, aortic stenosis who presents to the office with shortness of breath over the last six months.  He has associated fatigue.  He has had some episodes of chest pain associated with Covid infection.  He denies lower extremity edema, dizziness, syncope.  He was seen by his cardiologist and underwent an echocardiogram which shows a progression to severe aortic stenosis.    He is accompanied by his wife today.    PAST MEDICAL HISTORY:   Active Ambulatory Problems     Diagnosis Date Noted    Eczema 01/09/2015    Heart murmur 01/09/2015    Tobacco abuse 01/09/2015    Abnormal chest x-ray 10/31/2018     Resolved Ambulatory Problems     Diagnosis Date Noted    No Resolved Ambulatory Problems     No Additional Past Medical History       PAST SURGICAL HISTORY: No previous chest surgery     ALLERGIES:   Allergies   Allergen Reactions    Eggs         CURRENT MEDICATIONS:   Current Outpatient Medications:     traMADol (ULTRAM) 50 MG Tab, TAKE 1 TABLET BY MOUTH EVERY 12 HOURS AS NEEDED FOR PAIN. R07.9. DNFU 5/5/23, Disp: , Rfl:     oxyCODONE immediate-release (ROXICODONE) 5 MG Tab, TAKE 1 TABLET BY MOUTH EVERY 4 HOURS FOR 3 DAYS AS NEEDED FOR MODERATE PAIN, Disp: , Rfl:     BREZTRI AEROSPHERE 160-9-4.8 MCG/ACT Aerosol, , Disp: , Rfl:     albuterol 108 (90 Base) MCG/ACT Aero Soln inhalation aerosol, Inhale 2 Puffs every 6 hours., Disp: , Rfl:     triamcinolone acetonide (KENALOG) 0.025 % Cream, Apply 1 Application to affected area(s) 2 times a day., Disp: 1 Tube, Rfl: 0    triamcinolone acetonide (KENALOG) 0.1 % OINT, Apply 1 Application to affected area(s) 2 times a day., Disp: 1 Tube, Rfl: 3    FAMILY HISTORY:   Family History   Problem Relation Age of Onset    Heart Disease Mother 86    Cancer  Father         lung        SOCIAL HISTORY:   Social History     Socioeconomic History    Marital status:      Spouse name: Not on file    Number of children: Not on file    Years of education: Not on file    Highest education level: Not on file   Occupational History    Not on file   Tobacco Use    Smoking status: Every Day     Packs/day: 1.00     Types: Cigarettes    Smokeless tobacco: Former   Substance and Sexual Activity    Alcohol use: No    Drug use: No    Sexual activity: Yes     Partners: Female   Other Topics Concern    Not on file   Social History Narrative    Not on file     Social Determinants of Health     Financial Resource Strain: Not on file   Food Insecurity: Not on file   Transportation Needs: Not on file   Physical Activity: Not on file   Stress: Not on file   Social Connections: Not on file   Intimate Partner Violence: Not on file   Housing Stability: Not on file       REVIEW OF SYSTEMS:  Review of Systems   Constitutional:  Positive for malaise/fatigue. Negative for chills, fever and weight loss.   HENT:  Negative for ear pain, nosebleeds and tinnitus.    Eyes:  Negative for double vision, photophobia and pain.   Respiratory:  Positive for shortness of breath. Negative for cough and hemoptysis.    Cardiovascular:  Negative for chest pain, palpitations, orthopnea, leg swelling and PND.   Gastrointestinal:  Negative for abdominal pain, blood in stool, nausea and vomiting.   Genitourinary:  Negative for frequency, hematuria and urgency.   Musculoskeletal: Negative.    Skin:  Negative for rash.   Neurological:  Negative for dizziness, tremors, speech change, focal weakness, seizures and headaches.   Endo/Heme/Allergies:  Negative for polydipsia. Does not bruise/bleed easily.   Psychiatric/Behavioral:  Negative for hallucinations and memory loss.          PHYSICAL EXAMINATION:    /62 (BP Location: Left arm, Patient Position: Sitting, BP Cuff Size: Adult)   Pulse 74   Temp 36.7 °C (98  "°F) (Temporal)   Ht 1.905 m (6' 3\")   Wt 71 kg (156 lb 8 oz)   SpO2 97%   BMI 19.56 kg/m²    Physical Exam  Vitals reviewed.   Constitutional:       General: He is not in acute distress.     Appearance: Normal appearance.      Comments: Strong smell of cigarettes   HENT:      Head: Normocephalic and atraumatic.      Right Ear: External ear normal.      Left Ear: External ear normal.      Nose: Nose normal. No congestion.   Eyes:      General: No scleral icterus.     Extraocular Movements: Extraocular movements intact.      Conjunctiva/sclera: Conjunctivae normal.   Cardiovascular:      Rate and Rhythm: Normal rate and regular rhythm.   Pulmonary:      Effort: Pulmonary effort is normal. No respiratory distress.   Abdominal:      General: There is no distension.      Palpations: Abdomen is soft.   Musculoskeletal:         General: Normal range of motion.      Cervical back: Normal range of motion.   Skin:     General: Skin is warm and dry.      Coloration: Skin is not jaundiced.      Findings: No rash.   Neurological:      Mental Status: He is alert and oriented to person, place, and time.      Cranial Nerves: No cranial nerve deficit.   Psychiatric:         Mood and Affect: Mood normal.         Behavior: Behavior normal.         IMAGING REVIEWED AND INTERPRETED:    ECHOCARDIOGRAM   LVEF: 60%  Mean AV Gradient 47mmHg  Peak AV Gradient :  AV Max: 4.57 m/s  BEST: 0.6cm2    CARDIAC CATHETERIZATION   4/17/23   No angiographically significant coronary artery disease    CT SCAN CHEST pending      IMPRESSION:  71 yo gentleman with known conditions of COPD and ongoing tobacco abuse. He now has severe symptomatic aortic stenosis.      PLAN:  I recommend that the patient is a reasonable TAVR candidate given age and strong preference. He states that under no circumstances would he want a sternotomy. We will continue to follow workup and discuss in multidisciplinary conference.    The STS mortality risk score is 1% and the " morbidity and mortality risk score is 7.3%. The scores were discussed with patient.    Thank you for this very challenging consultation and participation in the patient’s care.  I will keep you apprised of all future developments.      Sincerely,     Minerva Loving MD

## 2023-05-23 ENCOUNTER — OFFICE VISIT (OUTPATIENT)
Dept: CARDIOTHORACIC SURGERY | Facility: MEDICAL CENTER | Age: 73
End: 2023-05-23
Payer: MEDICARE

## 2023-05-23 VITALS
HEART RATE: 74 BPM | SYSTOLIC BLOOD PRESSURE: 110 MMHG | HEIGHT: 75 IN | TEMPERATURE: 98 F | DIASTOLIC BLOOD PRESSURE: 62 MMHG | OXYGEN SATURATION: 97 % | BODY MASS INDEX: 19.46 KG/M2 | WEIGHT: 156.5 LBS

## 2023-05-23 DIAGNOSIS — I35.0 SEVERE AORTIC STENOSIS: ICD-10-CM

## 2023-05-23 PROCEDURE — 3074F SYST BP LT 130 MM HG: CPT | Performed by: THORACIC SURGERY (CARDIOTHORACIC VASCULAR SURGERY)

## 2023-05-23 PROCEDURE — 99205 OFFICE O/P NEW HI 60 MIN: CPT | Performed by: THORACIC SURGERY (CARDIOTHORACIC VASCULAR SURGERY)

## 2023-05-23 PROCEDURE — 3078F DIAST BP <80 MM HG: CPT | Performed by: THORACIC SURGERY (CARDIOTHORACIC VASCULAR SURGERY)

## 2023-05-23 RX ORDER — BUDESONIDE, GLYCOPYRROLATE, AND FORMOTEROL FUMARATE 160; 9; 4.8 UG/1; UG/1; UG/1
AEROSOL, METERED RESPIRATORY (INHALATION)
COMMUNITY
Start: 2023-05-18

## 2023-05-23 RX ORDER — TRAMADOL HYDROCHLORIDE 50 MG/1
TABLET ORAL
COMMUNITY
Start: 2023-05-11

## 2023-05-23 RX ORDER — ALBUTEROL SULFATE 90 UG/1
2 AEROSOL, METERED RESPIRATORY (INHALATION) EVERY 6 HOURS
COMMUNITY
Start: 2023-03-01

## 2023-05-23 RX ORDER — OXYCODONE HYDROCHLORIDE 5 MG/1
TABLET ORAL
COMMUNITY
Start: 2023-02-23

## 2023-05-23 ASSESSMENT — ENCOUNTER SYMPTOMS
FEVER: 0
VOMITING: 0
DOUBLE VISION: 0
ABDOMINAL PAIN: 0
DIZZINESS: 0
HEMOPTYSIS: 0
PND: 0
ORTHOPNEA: 0
HEADACHES: 0
POLYDIPSIA: 0
COUGH: 0
PHOTOPHOBIA: 0
SEIZURES: 0
CHILLS: 0
MUSCULOSKELETAL NEGATIVE: 1
MEMORY LOSS: 0
EYE PAIN: 0
SPEECH CHANGE: 0
BLOOD IN STOOL: 0
TREMORS: 0
FOCAL WEAKNESS: 0
WEIGHT LOSS: 0
NAUSEA: 0
SHORTNESS OF BREATH: 1
HALLUCINATIONS: 0
PALPITATIONS: 0
BRUISES/BLEEDS EASILY: 0